# Patient Record
Sex: MALE | Race: WHITE | NOT HISPANIC OR LATINO | URBAN - METROPOLITAN AREA
[De-identification: names, ages, dates, MRNs, and addresses within clinical notes are randomized per-mention and may not be internally consistent; named-entity substitution may affect disease eponyms.]

---

## 2018-10-17 NOTE — PATIENT DISCUSSION
(H01.005) Unspecified blepharitis left lower eyelid - Assesment : Examination revealed Blepharitis. - Plan : See plan #4.

## 2018-10-17 NOTE — PATIENT DISCUSSION
(H40.033) Anatomical narrow angle, bilateral - Assesment : Examination revealed narrow angle glaucoma OU. - Plan : Monitor at this time. Recommend gonioscopy at next visit.

## 2018-10-17 NOTE — PATIENT DISCUSSION
(H10.45) Other chronic allergic conjunctivitis - Assesment : Examination revealed Allergic Conjunctivitis OU. Follicles OU likely environmental. Denies pets at home. - Plan : Recommend artificial tears 2-3 times daily OU to help wash out allergens. Monitor for changes. Advised patient to call our office with decreased vision or an increase in symptoms.

## 2018-10-17 NOTE — PATIENT DISCUSSION
(H01.002) Unspecified blepharitis right lower eyelid - Assesment : Examination revealed Blepharitis. - Plan : Recommend warm compresses at bedtime a few times weekly for maintenance. Monitor for changes.

## 2018-10-17 NOTE — PATIENT DISCUSSION
(H25.013) Cortical age-related cataract, bilateral - Assesment : Examination revealed Cortical Senile Cataract. Patient is asymptomatic with visual function minimally affected. - Plan : Monitor for changes. Advised patient to call our office with decreased vision or increased symptoms. RV 2-3 months tn check/[facility] OCT/gonio.

## 2018-10-17 NOTE — PATIENT DISCUSSION
(H40.013) Open angle with borderline findings, low risk, bilateral - Assesment : Examination revealed suspicion for Open Angle Glaucoma OU. Large nerves OU. Denies Fhx of glaucoma. - Plan : Monitor for changes. Advised patient to call our office when decreased vision or increased eye pain.

## 2020-02-21 ENCOUNTER — IMPORTED ENCOUNTER (OUTPATIENT)
Dept: URBAN - METROPOLITAN AREA CLINIC 50 | Facility: CLINIC | Age: 65
End: 2020-02-21

## 2020-02-27 ENCOUNTER — IMPORTED ENCOUNTER (OUTPATIENT)
Dept: URBAN - METROPOLITAN AREA CLINIC 50 | Facility: CLINIC | Age: 65
End: 2020-02-27

## 2021-04-17 ASSESSMENT — VISUAL ACUITY
OS_SC: 20/30+2
OD_CC: J1
OS_OTHER: 20/20.
OS_CC: J1
OD_OTHER: 20/20.
OD_SC: 20/20
OS_PH: 20/20

## 2021-04-17 ASSESSMENT — TONOMETRY
OD_IOP_MMHG: 17
OS_IOP_MMHG: 17

## 2022-02-16 ENCOUNTER — PREPPED CHART (OUTPATIENT)
Dept: URBAN - METROPOLITAN AREA CLINIC 52 | Facility: CLINIC | Age: 67
End: 2022-02-16

## 2022-02-23 ENCOUNTER — COMPREHENSIVE EXAM (OUTPATIENT)
Dept: URBAN - METROPOLITAN AREA CLINIC 52 | Facility: CLINIC | Age: 67
End: 2022-02-23

## 2022-02-23 DIAGNOSIS — H43.813: ICD-10-CM

## 2022-02-23 DIAGNOSIS — H25.13: ICD-10-CM

## 2022-02-23 DIAGNOSIS — E11.9: ICD-10-CM

## 2022-02-23 PROCEDURE — 92014 COMPRE OPH EXAM EST PT 1/>: CPT

## 2022-02-23 ASSESSMENT — VISUAL ACUITY
OS_SC: 20/30-1
OU_CC: J1+
OD_GLARE: 20/20
OS_PH: 20/20-2
OD_SC: 20/25-2
OS_GLARE: 20/25

## 2022-02-23 ASSESSMENT — TONOMETRY
OD_IOP_MMHG: 16
OS_IOP_MMHG: 16

## 2022-02-23 NOTE — PATIENT DISCUSSION
Refraction was done today, patient deferred taking it. If patient calls back needing it we will need to charge for refraction.

## 2023-08-21 ENCOUNTER — COMPREHENSIVE EXAM (OUTPATIENT)
Dept: URBAN - METROPOLITAN AREA CLINIC 52 | Facility: CLINIC | Age: 68
End: 2023-08-21

## 2023-08-21 DIAGNOSIS — E11.9: ICD-10-CM

## 2023-08-21 DIAGNOSIS — H43.813: ICD-10-CM

## 2023-08-21 DIAGNOSIS — H25.12: ICD-10-CM

## 2023-08-21 DIAGNOSIS — H25.811: ICD-10-CM

## 2023-08-21 PROCEDURE — 99214 OFFICE O/P EST MOD 30 MIN: CPT

## 2023-08-21 PROCEDURE — 92015 DETERMINE REFRACTIVE STATE: CPT

## 2023-08-21 ASSESSMENT — VISUAL ACUITY
OS_GLARE: 20/25
OD_SC: 20/20-1
OD_GLARE: 20/20
OU_CC: J1+
OS_GLARE: 20/25
OD_GLARE: 20/20
OS_SC: 20/30

## 2023-08-21 ASSESSMENT — TONOMETRY
OD_IOP_MMHG: 16
OS_IOP_MMHG: 16

## 2024-09-24 ENCOUNTER — COMPREHENSIVE EXAM (OUTPATIENT)
Dept: URBAN - METROPOLITAN AREA CLINIC 53 | Facility: CLINIC | Age: 69
End: 2024-09-24

## 2024-09-24 DIAGNOSIS — H25.811: ICD-10-CM

## 2024-09-24 DIAGNOSIS — H43.813: ICD-10-CM

## 2024-09-24 DIAGNOSIS — H52.4: ICD-10-CM

## 2024-09-24 DIAGNOSIS — H25.12: ICD-10-CM

## 2024-09-24 DIAGNOSIS — E11.9: ICD-10-CM

## 2024-09-24 PROCEDURE — 92015 DETERMINE REFRACTIVE STATE: CPT

## 2024-09-24 PROCEDURE — 99214 OFFICE O/P EST MOD 30 MIN: CPT
